# Patient Record
Sex: MALE | Race: BLACK OR AFRICAN AMERICAN | NOT HISPANIC OR LATINO | Employment: UNEMPLOYED | URBAN - METROPOLITAN AREA
[De-identification: names, ages, dates, MRNs, and addresses within clinical notes are randomized per-mention and may not be internally consistent; named-entity substitution may affect disease eponyms.]

---

## 2019-06-17 PROBLEM — B18.2 CHRONIC HEPATITIS C WITHOUT HEPATIC COMA: Status: ACTIVE | Noted: 2019-03-20

## 2019-06-17 PROBLEM — E11.43 DIABETIC AUTONOMIC NEUROPATHY ASSOCIATED WITH TYPE 2 DIABETES MELLITUS: Status: ACTIVE | Noted: 2019-04-08

## 2019-06-17 PROBLEM — I95.1 ORTHOSTATIC HYPOTENSION: Status: ACTIVE | Noted: 2019-05-22

## 2019-06-17 PROBLEM — E86.1 INTRAVASCULAR VOLUME DEPLETION: Status: ACTIVE | Noted: 2019-06-17

## 2019-08-16 ENCOUNTER — TELEPHONE (OUTPATIENT)
Dept: PHARMACY | Facility: CLINIC | Age: 59
End: 2019-08-16

## 2019-08-28 ENCOUNTER — TELEPHONE (OUTPATIENT)
Dept: PHARMACY | Facility: CLINIC | Age: 59
End: 2019-08-28

## 2019-09-04 ENCOUNTER — TELEPHONE (OUTPATIENT)
Dept: PHARMACY | Facility: CLINIC | Age: 59
End: 2019-09-04

## 2019-09-04 NOTE — TELEPHONE ENCOUNTER
Patient will be dispensed authorized generic of Epclusa.  All references to Epclusa will be for the authorized generic.    Initial Epclusa consult completed on . Epclusa will be shipped on  to arrive at patient's home on  via [employer]. $0.00 copay. Patient will start Epclusa on . Address confirmed, CC on file. Confirmed 2 patient identifiers - name and . Therapy Appropriate.     Epclusa 400/100mg- Take one tablet by mouth daily x 12 weeks  Counseling was reviewed:   1. Patient MUST take Epclusa at the SAME time every day.   2. Patient MUST avoid acid reducers without consulting with myself or provider first. Antacids are to be spaced out at least 4 hours apart from Epclusa.  H2 blocker is to be taken AT THE SAME TIME as Epclusa.    3. Potential Side effects include: nausea, headaches, insomnia and fatigue.   Headache: Patient may treat with OTC remedies. If Tylenol is used, dose should not exceed 2000mg per day.    4. Medication list reviewed. No DDIs or allergies noted. Patient MUST contact myself or provider prior to starting any new OTC, herbal, or prescription drugs to avoid potential DDIs.    DDI: Medication list reviewed and potential DDIs addressed.  Patient aware to hold atorvastatin and pantoprazole while on Epclusa.  Patient will try H2 blocker to be dosed at the same time as Epclusa.    Discussed the importance of staying well hydrated while on therapy. Compliance stressed - patient to take missed doses as soon as remembered, but NOT to take 2 doses in one day. Patient will report questions or concerns to myself or practitioner. Patient verbalizes understanding. Patient plans to start Epclusa on .  Consultation included: indication; goals of treatment; administration; storage and handling; side effects; how to handle side effects; the importance of compliance; how to handle missed doses; the importance of laboratory monitoring; the importance of keeping all follow up appointments.  Patient  understands to report any medication changes to [facility] and provider. All questions answered and addressed to patients satisfaction. I will f/u with her in 1 week from start, [facility] to contact patient in 3 weeks for refills.

## 2019-09-27 ENCOUNTER — TELEPHONE (OUTPATIENT)
Dept: PHARMACY | Facility: CLINIC | Age: 59
End: 2019-09-27

## 2019-09-27 NOTE — TELEPHONE ENCOUNTER
AG Epclusa (2 of 3)  refill confirmed. We will ship AG Epclusa refill on  via [employer] to arrive on 10/1. $0.00 copay- 004. Confirmed 2 patient identifiers - name and .     Patient has 6 doses of AG Epclusa remaining and takes it around 9 AM daily. Pt reports they are not having any side effects so far. He is NOT taking Lipitor or Protonix at this time; no heart burn symptoms.  Reviewed he could take Tums, prn heart burn if  from Epclusa by 4 hours. No missed doses, no new medications, no new allergies or health conditions reported at this time. All questions answered and addressed to patients satisfaction. Pt verbalized understanding.

## 2019-10-07 PROBLEM — E86.1 INTRAVASCULAR VOLUME DEPLETION: Status: RESOLVED | Noted: 2019-06-17 | Resolved: 2019-10-07

## 2019-10-09 PROBLEM — E11.49 CONTROLLED TYPE 2 DIABETES MELLITUS WITH NEUROLOGIC COMPLICATION, WITH LONG-TERM CURRENT USE OF INSULIN: Status: ACTIVE | Noted: 2019-04-08

## 2019-10-09 PROBLEM — Z79.4 CONTROLLED TYPE 2 DIABETES MELLITUS WITH NEUROLOGIC COMPLICATION, WITH LONG-TERM CURRENT USE OF INSULIN: Status: ACTIVE | Noted: 2019-04-08

## 2019-10-21 ENCOUNTER — TELEPHONE (OUTPATIENT)
Dept: PHARMACY | Facility: CLINIC | Age: 59
End: 2019-10-21

## 2020-03-04 PROBLEM — B18.2 CHRONIC HEPATITIS C WITHOUT HEPATIC COMA: Status: RESOLVED | Noted: 2019-03-20 | Resolved: 2020-03-04

## 2020-07-04 PROBLEM — R91.1 PULMONARY NODULE: Status: ACTIVE | Noted: 2020-07-04

## 2020-07-04 PROBLEM — S22.42XA CLOSED FRACTURE OF MULTIPLE RIBS OF LEFT SIDE: Status: ACTIVE | Noted: 2020-07-04

## 2020-07-04 PROBLEM — N18.9 CKD (CHRONIC KIDNEY DISEASE): Status: RESOLVED | Noted: 2020-07-04 | Resolved: 2020-07-04

## 2020-07-04 PROBLEM — Z86.11 HISTORY OF TB (TUBERCULOSIS): Status: ACTIVE | Noted: 2020-07-04

## 2020-07-04 PROBLEM — R55 SYNCOPE: Status: ACTIVE | Noted: 2020-07-04

## 2020-07-04 PROBLEM — N18.9 CKD (CHRONIC KIDNEY DISEASE): Status: ACTIVE | Noted: 2020-07-04

## 2020-07-04 PROBLEM — N17.9 AKI (ACUTE KIDNEY INJURY): Status: ACTIVE | Noted: 2020-07-04

## 2020-07-05 PROBLEM — S22.42XD CLOSED FRACTURE OF MULTIPLE RIBS OF LEFT SIDE WITH ROUTINE HEALING: Status: ACTIVE | Noted: 2020-07-04

## 2020-07-05 PROBLEM — E11.43: Status: ACTIVE | Noted: 2019-04-08

## 2020-07-06 PROBLEM — N17.9 AKI (ACUTE KIDNEY INJURY): Status: RESOLVED | Noted: 2020-07-04 | Resolved: 2020-07-06

## 2021-06-21 PROBLEM — R06.02 SOB (SHORTNESS OF BREATH): Status: ACTIVE | Noted: 2021-06-21

## 2021-06-22 PROBLEM — I63.9 CVA (CEREBRAL VASCULAR ACCIDENT): Status: ACTIVE | Noted: 2021-06-22

## 2021-06-22 PROBLEM — M25.511 RIGHT SHOULDER PAIN: Status: ACTIVE | Noted: 2021-06-22

## 2021-06-22 PROBLEM — R06.02 SOB (SHORTNESS OF BREATH): Status: RESOLVED | Noted: 2021-06-21 | Resolved: 2021-06-22

## 2021-06-22 PROBLEM — N17.9 AKI (ACUTE KIDNEY INJURY): Status: ACTIVE | Noted: 2021-06-22

## 2021-08-11 PROBLEM — E11.43 TYPE 2 DIABETES MELLITUS WITH DIABETIC AUTONOMIC NEUROPATHY, WITH LONG-TERM CURRENT USE OF INSULIN: Status: ACTIVE | Noted: 2021-08-11

## 2021-08-11 PROBLEM — E11.43: Status: RESOLVED | Noted: 2019-04-08 | Resolved: 2021-08-11

## 2021-08-11 PROBLEM — Z79.4 TYPE 2 DIABETES MELLITUS WITH DIABETIC AUTONOMIC NEUROPATHY, WITH LONG-TERM CURRENT USE OF INSULIN: Status: ACTIVE | Noted: 2021-08-11

## 2021-08-11 PROBLEM — Z79.4: Status: RESOLVED | Noted: 2019-04-08 | Resolved: 2021-08-11

## 2021-08-11 PROBLEM — E11.9 TYPE 2 DIABETES MELLITUS, WITH LONG-TERM CURRENT USE OF INSULIN: Status: ACTIVE | Noted: 2019-04-08

## 2021-08-11 PROBLEM — E11.9 TYPE 2 DIABETES MELLITUS, WITH LONG-TERM CURRENT USE OF INSULIN: Status: RESOLVED | Noted: 2019-04-08 | Resolved: 2021-08-11

## 2021-08-11 PROBLEM — Z79.4 TYPE 2 DIABETES MELLITUS, WITH LONG-TERM CURRENT USE OF INSULIN: Status: ACTIVE | Noted: 2019-04-08

## 2021-08-11 PROBLEM — Z79.4 TYPE 2 DIABETES MELLITUS, WITH LONG-TERM CURRENT USE OF INSULIN: Status: RESOLVED | Noted: 2019-04-08 | Resolved: 2021-08-11

## 2021-09-25 PROBLEM — K42.9 UMBILICAL HERNIA WITHOUT OBSTRUCTION AND WITHOUT GANGRENE: Status: ACTIVE | Noted: 2021-09-25

## 2021-09-25 PROBLEM — K40.20 BILATERAL INGUINAL HERNIA WITHOUT OBSTRUCTION OR GANGRENE: Status: ACTIVE | Noted: 2021-09-25

## 2022-03-10 PROBLEM — N17.9 ACUTE KIDNEY INJURY: Status: RESOLVED | Noted: 2020-07-04 | Resolved: 2022-03-10

## 2022-03-10 PROBLEM — N17.9 AKI (ACUTE KIDNEY INJURY): Status: RESOLVED | Noted: 2021-06-22 | Resolved: 2022-03-10

## 2022-03-11 PROBLEM — R80.1 PERSISTENT PROTEINURIA: Chronic | Status: ACTIVE | Noted: 2022-03-11

## 2022-03-11 PROBLEM — E11.22 CKD STAGE 3 SECONDARY TO DIABETES: Chronic | Status: ACTIVE | Noted: 2022-03-11

## 2022-03-11 PROBLEM — R31.1 BENIGN ESSENTIAL MICROSCOPIC HEMATURIA: Chronic | Status: ACTIVE | Noted: 2022-03-11

## 2022-03-11 PROBLEM — N18.30 CKD STAGE 3 SECONDARY TO DIABETES: Chronic | Status: ACTIVE | Noted: 2022-03-11

## 2022-03-11 PROBLEM — I95.1 ORTHOSTATIC HYPOTENSION: Status: RESOLVED | Noted: 2019-05-22 | Resolved: 2022-03-11

## 2022-05-03 PROBLEM — N18.2 CKD (CHRONIC KIDNEY DISEASE) STAGE 2, GFR 60-89 ML/MIN: Status: ACTIVE | Noted: 2022-03-11

## 2023-02-08 PROBLEM — R09.89 SUSPECTED CEREBROVASCULAR ACCIDENT (CVA): Status: RESOLVED | Noted: 2023-02-08 | Resolved: 2023-02-08

## 2023-02-08 PROBLEM — I10 PRIMARY HYPERTENSION: Status: ACTIVE | Noted: 2023-02-08

## 2023-02-08 PROBLEM — R09.89 SUSPECTED CEREBROVASCULAR ACCIDENT (CVA): Status: ACTIVE | Noted: 2023-02-08

## 2023-02-08 PROBLEM — E11.9 TYPE 2 DIABETES MELLITUS, WITHOUT LONG-TERM CURRENT USE OF INSULIN: Status: ACTIVE | Noted: 2023-02-08

## 2023-02-08 PROBLEM — I63.9 CVA (CEREBRAL VASCULAR ACCIDENT): Status: ACTIVE | Noted: 2023-02-08

## 2023-02-10 PROBLEM — I63.9 LEFT PONTINE CVA: Status: ACTIVE | Noted: 2023-02-10

## 2023-02-10 PROBLEM — I63.511 ACUTE ISCHEMIC RIGHT MCA STROKE: Status: ACTIVE | Noted: 2023-02-10

## 2023-02-10 PROBLEM — E11.65 TYPE 2 DIABETES MELLITUS WITH HYPERGLYCEMIA, WITH LONG-TERM CURRENT USE OF INSULIN: Status: ACTIVE | Noted: 2023-02-08

## 2023-02-10 PROBLEM — I63.9 ACUTE CVA (CEREBROVASCULAR ACCIDENT): Status: ACTIVE | Noted: 2023-02-10

## 2023-02-10 PROBLEM — Z79.4 TYPE 2 DIABETES MELLITUS WITH HYPERGLYCEMIA, WITH LONG-TERM CURRENT USE OF INSULIN: Status: ACTIVE | Noted: 2023-02-08

## 2023-02-11 PROBLEM — I63.9 CVA (CEREBRAL VASCULAR ACCIDENT): Status: RESOLVED | Noted: 2023-02-08 | Resolved: 2023-02-11

## 2023-05-15 PROBLEM — I63.9 LEFT PONTINE CVA: Status: RESOLVED | Noted: 2023-02-10 | Resolved: 2023-05-15

## 2023-05-15 PROBLEM — I63.9 ACUTE CVA (CEREBROVASCULAR ACCIDENT): Status: RESOLVED | Noted: 2023-02-10 | Resolved: 2023-05-15

## 2023-05-15 PROBLEM — I63.511 ACUTE ISCHEMIC RIGHT MCA STROKE: Status: RESOLVED | Noted: 2023-02-10 | Resolved: 2023-05-15

## 2023-05-17 PROBLEM — R42 DIZZINESS: Status: ACTIVE | Noted: 2023-05-17

## 2024-01-18 PROBLEM — I63.231 STROKE DUE TO STENOSIS OF RIGHT CAROTID ARTERY: Status: ACTIVE | Noted: 2024-01-18

## 2024-01-18 PROBLEM — I63.9 ACUTE ISCHEMIC STROKE: Status: ACTIVE | Noted: 2024-01-18

## 2024-01-19 PROBLEM — N18.2 STAGE 2 CHRONIC KIDNEY DISEASE: Status: RESOLVED | Noted: 2022-03-11 | Resolved: 2024-01-19

## 2024-01-29 PROBLEM — R00.0 TACHYCARDIA: Status: ACTIVE | Noted: 2024-01-29

## 2024-01-29 PROBLEM — K80.00 CALCULUS OF GALLBLADDER WITH ACUTE CHOLECYSTITIS WITHOUT OBSTRUCTION: Status: ACTIVE | Noted: 2024-01-29

## 2024-02-03 PROBLEM — J96.01 ACUTE HYPOXEMIC RESPIRATORY FAILURE: Status: ACTIVE | Noted: 2024-02-03

## 2024-02-05 PROBLEM — R07.9 CHEST PAIN: Status: ACTIVE | Noted: 2024-02-05

## 2024-02-05 PROBLEM — R10.11 RUQ PAIN: Status: ACTIVE | Noted: 2024-02-05

## 2024-02-05 PROBLEM — K81.9 CHOLECYSTITIS: Status: ACTIVE | Noted: 2024-02-05

## 2024-02-05 PROBLEM — N17.9 AKI (ACUTE KIDNEY INJURY): Status: RESOLVED | Noted: 2021-06-22 | Resolved: 2024-02-05

## 2024-02-07 PROBLEM — D72.9 NEUTROPHILIC LEUKOCYTOSIS: Status: ACTIVE | Noted: 2024-02-07

## 2024-02-07 PROBLEM — E87.6 HYPOKALEMIA: Status: ACTIVE | Noted: 2024-02-07

## 2024-02-08 PROBLEM — E87.6 HYPOKALEMIA: Status: RESOLVED | Noted: 2024-02-07 | Resolved: 2024-02-08

## 2024-02-09 PROBLEM — A41.9 SEPTIC SHOCK: Status: ACTIVE | Noted: 2024-02-09

## 2024-02-09 PROBLEM — R65.21 SEPTIC SHOCK: Status: ACTIVE | Noted: 2024-02-09

## 2024-02-10 PROBLEM — Z75.8 DISCHARGE PLANNING ISSUES: Status: ACTIVE | Noted: 2024-02-10

## 2024-02-11 PROBLEM — M79.89 SWELLING OF RIGHT HAND: Status: ACTIVE | Noted: 2024-02-11

## 2024-02-12 PROBLEM — D62 ACUTE BLOOD LOSS ANEMIA: Status: ACTIVE | Noted: 2024-02-12

## 2024-02-14 PROBLEM — A41.9 SEPTIC SHOCK: Status: RESOLVED | Noted: 2024-02-09 | Resolved: 2024-02-14

## 2024-02-14 PROBLEM — R65.21 SEPTIC SHOCK: Status: RESOLVED | Noted: 2024-02-09 | Resolved: 2024-02-14

## 2024-02-14 PROBLEM — J96.01 ACUTE HYPOXEMIC RESPIRATORY FAILURE: Status: RESOLVED | Noted: 2024-02-03 | Resolved: 2024-02-14

## 2024-03-03 PROBLEM — I95.9 HYPOTENSION: Status: ACTIVE | Noted: 2019-05-22

## 2024-04-22 PROBLEM — I63.231 STROKE DUE TO STENOSIS OF RIGHT CAROTID ARTERY: Status: RESOLVED | Noted: 2024-01-18 | Resolved: 2024-04-22

## 2024-04-22 PROBLEM — I63.9 ACUTE ISCHEMIC STROKE: Status: RESOLVED | Noted: 2024-01-18 | Resolved: 2024-04-22

## 2024-04-29 PROBLEM — I63.9 CVA (CEREBRAL VASCULAR ACCIDENT): Status: RESOLVED | Noted: 2021-06-22 | Resolved: 2024-04-29

## 2024-06-03 PROBLEM — N17.9 AKI (ACUTE KIDNEY INJURY): Status: RESOLVED | Noted: 2021-06-22 | Resolved: 2024-06-03
